# Patient Record
Sex: FEMALE | Race: AMERICAN INDIAN OR ALASKA NATIVE
[De-identification: names, ages, dates, MRNs, and addresses within clinical notes are randomized per-mention and may not be internally consistent; named-entity substitution may affect disease eponyms.]

---

## 2021-01-21 ENCOUNTER — HOSPITAL ENCOUNTER (OUTPATIENT)
Dept: HOSPITAL 5 - SPVWC | Age: 36
Discharge: HOME | End: 2021-01-21
Attending: SURGERY
Payer: COMMERCIAL

## 2021-01-21 DIAGNOSIS — N64.59: Primary | ICD-10-CM

## 2021-01-21 PROCEDURE — 77066 DX MAMMO INCL CAD BI: CPT

## 2021-01-21 NOTE — MAMMOGRAPHY REPORT
DIGITAL DIAGNOSTIC MAMMOGRAM WITH CAD CONVENTIONAL, 1/21/2021



CLINICAL INFORMATION / INDICATION: RIGHT BREAST PAIN/LUMP



TECHNIQUE:  Digital bilateral mammographic imaging was performed. Spot compression views were obtaine
d.

This examination was interpreted with the benefit of Computer-aided Detection analysis. 



COMPARISON: Baseline.



FINDINGS: 



Breast Density: The breasts are almost entirely fatty.



No dominant mass, suspicious calcifications or architectural distortion in either breast. 



No abnormality is identified on spot compression views of the right breast. An ultrasound technologis
t is not available today.



IMPRESSION: No mammographic evidence of malignancy. The patient should return for right breast ultras
ound to evaluate the right breast lump and pain.



Follow up recommendation: Ultrasound



BI-RADS Category 0: Incomplete. Needs additional imaging evaluation and/or prior mammograms for nlelie king.



-------------------------------------------------------------------------------------------

A "normal" or negative report should not discourage follow up or biopsy of a clinically significant f
inding.



A written summary of these findings will be mailed to the patient. The patient will be entered into a
 mammography reporting system which will generate a reminder letter for the patient's next appointmen
t at the appropriate interval.



According to the [employer], yearly mammograms are recommended starting at age 40 
and continuing as long as a woman is in good health.  Breast MRI is recommended for women with an jes
roximately 20-25% or greater lifetime risk of breast cancer, including women with a strong family his
tory of breast or ovarian cancer and women who have been treated for Hodgkin's disease.



Signer Name: Eder Scales MD 

Signed: 1/21/2021 11:34 AM

Workstation Name: [id number]